# Patient Record
(demographics unavailable — no encounter records)

---

## 2025-03-21 NOTE — DISCUSSION/SUMMARY
[de-identified] : Right Hip trochanteric bursitis  Injection: Right hip trochanteric bursa. Indication: Trochanteric bursitis.  A discussion was had with the patient regarding this procedure and all questions were answered. All risks, benefits and alternatives were discussed. These included but were not limited to bleeding, infection, and allergic reaction. The patient lay in the left lateral decubitus position and the point of maximal tenderness over the right greater trochanter was localized. Alcohol was then used to clean the skin, and betadine was used to sterilize and prep the area in this location on lateral aspect of the right hip. Ethyl chloride spray was then used as a topical anesthetic. A 21-gauge needle was used to inject 8cc of 1% lidocaine and 1cc of 40mg/ml methylprednisolone into the right trochanteric bursa. A sterile bandage was then applied. The patient tolerated the procedure well and there were no complications.  Use ice, Tylenol, anti-inflammatory medication as needed. FU as needed.

## 2025-03-21 NOTE — HISTORY OF PRESENT ILLNESS
[de-identified] : 58 year old F patient presents today for evaluation of their right hip. She states began 3-4 months ago, recently has gotten worse. She enjoys walking regularly. She states she has pain when she is seated for long periods or if she walks for long periods. She does not recall any acute trauma or injury. She denies any numbness or tingling. The patient's past medical history, past surgical history, medications, allergies, and social history were reviewed by me today with the patient and documented accordingly. In addition, the patient's family history, which is noncontributory to this visit, was also reviewed.

## 2025-03-21 NOTE — PHYSICAL EXAM
[de-identified] : General Exam   Well developed, well nourished No apparent distress Oriented to person, place, and time Mood: Normal Affect: Normal Balance and coordination: Normal Gait: Normal  Right hip exam   Skin: Clean/dry and intact Inspection: No obvious deformity, no swelling, no ecchymosis. Tenderness:  + tenderness over greater trochanter/glut medius insertion. No tenderness pubic symphysis, pubic tubercle, hip flexors. No ttp ischial tuberosity or buttock. No ttp over the ASIS/Illiac crest. ROM: 0-120. Internal rotation 30 external rotation 70 Painful ROM: None Additional tests: No pain with circumduction negative impingement test at 90 mildly positive impingement test at 60 negative [facility] Strength: 5/5 hip flexion/ADD/ABD/Q/H/TA/GS/EHL Neuro: Sensation in tact to light touch throughout in dp/sp/tib/alex/saph distributions Pulses: 2+ DP/PT pulses [de-identified] : The following radiographs were ordered and read by me during this patients visit. I reviewed each radiograph in detail with the patient and discussed the findings as highlighted below.  AP pelvis AP lateral right hip radiographs were obtained today hip joint spaces well-maintained normal alignment no fracture

## 2025-05-14 NOTE — DISCUSSION/SUMMARY
[de-identified] : 59-year-old female longstanding plaint of lateral based right hip pain she is significant tenderness of the greater trochanter and significant weakness with resisted abduction she had extensive conservative care with greater than 6 weeks of anti-inflammatory medications and physical therapy exercises she had a corticosteroid injection to the lateral hip and trochanteric bursa without relief given the weakness in resisted hip abduction there is clinical concern for tearing of the gluteus medius and minimus tendons and MRIs is medically necessary at this time given her failure of conservative treatment for greater than 6 weeks she will follow-up after MRI.  All questions were answered

## 2025-05-14 NOTE — PHYSICAL EXAM
[de-identified] : Right hip exam   Skin: Clean/dry and intact Inspection: No obvious deformity, no swelling, no ecchymosis. Tenderness:  + tenderness over greater trochanter/glut medius insertion. No tenderness pubic symphysis, pubic tubercle, hip flexors. No ttp ischial tuberosity or buttock. No ttp over the ASIS/Illiac crest. ROM: 0-120. Internal rotation 30 external rotation 70 Painful ROM: None Additional tests: No pain with circumduction negative impingement test at 90 mildly positive impingement test at 60 negative Najma negative [facility] Strength: 5/5 hip flexion/ADD/Q/H/TA/GS/EHL 4/5 ABD Neuro: Sensation in tact to light touch throughout in dp/sp/tib/alex/saph distributions Pulses: 2+ DP/PT pulses

## 2025-05-14 NOTE — HISTORY OF PRESENT ILLNESS
[de-identified] : 59 year old F patient presents today for evaluation of their right hip. She states began 3-4 months ago, recently has gotten worse. She enjoys walking regularly. She states she has pain when she is seated for long periods or if she walks for long periods. She does not recall any acute trauma or injury. She denies any numbness or tingling.  She is a corticosteroid injection 2 months ago her pain has improved somewhat but she still experiences pain and weakness.  She feels like after walking 1 to 2 miles she is difficulty and walks with a limp.  She has difficulty in doing cross leg and sitting and standing from that position The patient's past medical history, past surgical history, medications, allergies, and social history were reviewed by me today with the patient and documented accordingly. In addition, the patient's family history, which is noncontributory to this visit, was also reviewed.

## 2025-06-27 NOTE — DISCUSSION/SUMMARY
[de-identified] : MRI right hip reviewed moderate to severe tendinosis of the gluteus minimus tendon with amorphus appearing fibers and surrounding peritendinous edema. Recommend continued nonoperative care given prescription for physical therapy, home exercise program/stretching and strengthening.  Over the counter NSAIDs / Tylenol as tolerated, as needed for pain, side effects discussed.  All questions answered. Given prescription for Voltaren gel side effects discussed. FU as needed. If not improved can consider ultrasound-guided injection to the gluteus minimus.  All questions were answered she may follow-up as needed Additional Notes: Pt is at 10.2 grams out of 14.5 grams\\nPt states she did not get her Rx last month. Detail Level: Simple

## 2025-06-27 NOTE — HISTORY OF PRESENT ILLNESS
[de-identified] : 59 year old F patient presents today for evaluation of their right hip. She states began 3-4 months ago, recently has gotten worse. She enjoys walking regularly. She states she has pain when she is seated for long periods or if she walks for long periods. She does not recall any acute trauma or injury. She denies any numbness or tingling.  She is a corticosteroid injection 2 months ago her pain has improved somewhat but she still experiences pain and weakness.  She feels like after walking 1 to 2 miles she is difficulty and walks with a limp.  She has difficulty in doing cross leg and sitting and standing from that position The patient's past medical history, past surgical history, medications, allergies, and social history were reviewed by me today with the patient and documented accordingly. In addition, the patient's family history, which is noncontributory to this visit, was also reviewed.

## 2025-06-27 NOTE — PHYSICAL EXAM
[de-identified] : Right hip exam   Skin: Clean/dry and intact Inspection: No obvious deformity, no swelling, no ecchymosis. Tenderness:  + tenderness over greater trochanter/glut medius insertion. No tenderness pubic symphysis, pubic tubercle, hip flexors. No ttp ischial tuberosity or buttock. No ttp over the ASIS/Illiac crest. ROM: 0-120. Internal rotation 30 external rotation 70 Painful ROM: None Additional tests: No pain with circumduction negative impingement test at 90 mildly positive impingement test at 60 negative Najma negative [facility] Strength: 5/5 hip flexion/ADD/Q/H/TA/GS/EHL 4/5 ABD Neuro: Sensation in tact to light touch throughout in dp/sp/tib/alex/saph distributions Pulses: 2+ DP/PT pulses [de-identified] : 	 EXAM: [id number] - MR HIP RT  - ORDERED BY: FRANKLIN CROWLEY   PROCEDURE DATE:  05/23/2025    INTERPRETATION:  CLINICAL INDICATION: Right-sided hip pain. Greater trochanteric bursitis.  Multiplanar Multisequence MR of the right hip.  Prior Studies: None.  FINDINGS:  ACETABULAR LABRUM AND HYALINE CARTILAGE: There is degeneration of the acetabular labrum with undersurface tearing and blunting along the anterosuperior to posterior superior portion. There is mild chondral thinning throughout the superolateral aspect of the femoral head and along the opposing acetabulum. Deep chondral fissuring is seen along the medial aspect of the femoral head.  OSSEOUS MORPHOLOGY: No cam-type morphology.  MARROW: No acute fracture or osteonecrosis.  SYNOVIUM: No hip joint effusion.  MUSCLES AND TENDONS: Moderate tendinosis of the hamstring tendons with mild surrounding peritendinous edema. There is intrasubstance tearing involving both the semimembranosus and conjoined biceps femoris/semitendinosus contribution. Trace edema within the iliopsoas bursa suggestive of mild bursitis. There is edema within the quadratus femoris muscle with narrowing of the ischial femoral space which can be seen in the setting of ischial femoral impingement. There is moderate to severe tendinosis of the gluteus minimus tendon with amorphus appearing fibers and surrounding peritendinous edema. There is an osseous excrescence arising from the anterior margin of the greater trochanter in this region likely related to traction enthesophyte. This traction enthesophyte is diffusely edematous consistent with stress reaction. Mild overlying greater trochanteric bursitis. The gluteus medius tendinous insertion is intact. The adductor tendinous insertions and origins are maintained.  NERVES: Right sciatic nerve is normal in course, signal, and morphology.  INTRAPELVIC STRUCTURES: Intact.  SUBCUTANEOUS TISSUES: Within normal limits.  IMPRESSION:  Moderate to severe tendinosis of the gluteus minimus tendon with amorphus appearing fibers and surrounding peritendinous edema. Osseous excrescence arising from the anterior margin of the greater trochanters likely related to traction enthesophyte. There is traction enthesophyte is diffusely edematous consistent with stress reaction.  Findings suggestive of ischial femoral impingement.  Moderate tendinosis of the hamstring tendon origin with foci of intrasubstance tearing involving both the semimembranosus and conjoined biceps femoris/semitendinosus contribution's.  Trace edema within the iliopsoas bursa suggestive of minimal bursitis.  Mild right hip arthrosis.  --- End of Report ---

## 2025-07-11 NOTE — PHYSICAL EXAM
[de-identified] : Constitutional: Well-nourished, well-developed, No acute distress Respiratory:  Good respiratory effort, no SOB Lymphatic: No regional lymphadenopathy, no lymphedema Psychiatric: Pleasant and normal affect, alert and oriented x3 Skin: Clean dry and intact B/L UE/LE Musculoskeletal: normal except where as noted in regional exam     Right knee: No asymmetry, malalignment, or swelling, Full ROM, 5/5 strength in Flex/Ext, Joints stable    Right foot ankle: APPEARANCE: no marked deformities or malalignment. No swelling POSITIVE TENDERNESS: Medial aspect and plantar aspect of right hallux proximal phalanx NONTENDER: medial malleolus, lateral malleolus, tibialis posterior tendon, achilles tendon, no marked thickening of tendon, ATFL, CFL, PTFL, anterior tibiofibular ligament (high ankle), sinus tarsus, deltoid ligaments, 5th metatarsal.  RANGE OF MOTION: full & painless.  RESISTIVE TESTING: painless resisted dorsiflexion, plantar flexion, inversion & eversion.  SPECIAL TESTS: neg anterior drawer. neg talar tilt. NEURO: Normal sensation of LE     [de-identified] : PROCEDURE DATE:  06/19/2025  INTERPRETATION:  EXAMINATION: MRI of the right foot without contrast  CLINICAL INFORMATION: Right toe pain. Status post fall.  TECHNIQUE: Multiplanar, multisequential MR imaging was performed.  FINDINGS: There is a nondisplaced fracture of the medial aspect of the head of the proximal phalanx of the hallux with adjacent marrow edema. There is a small associated IP joint effusion at the hallux. There are no other fractures. There are no other joint effusions or advanced arthritic changes. The capsular ligaments at the metatarsophalangeal joints and interphalangeal joints are intact. The Lisfranc ligament is intact. Tarsometatarsal alignment is normal. There are no tendon or muscle tears. There is no muscle atrophy. There is a small amount fluid within the first and third intermetatarsal space bursae.  IMPRESSION: Nondisplaced fracture of the medial aspect of the head of the proximal phalanx of the hallux with adjacent marrow edema.  --- End of Report ---

## 2025-07-11 NOTE — DISCUSSION/SUMMARY
[de-identified] : MARCOS LEVI  is a 59 year F with a nondisplaced fracture of the medial aspect of the head of the proximal phalanx of the hallux.   The patient was extensively counseled on treatment options including but not limited to observation, rest/activity modification, bracing, anti-inflammatory medications, physical therapy, injections, and surgery. The natural history of the disease was thoroughly explained. We discussed that the majority of the time, this condition can be initially treated conservatively.   Patient would like to proceed with: - Activity modification/rest -If symptoms persist for an additional 2 months I will have him see the foot and ankle specialist for possible treatment options -Multivitamins calcium and vitamin D which they already taking     I have personally obtained the history, reviewed the ROS as noted, and performed the physical examination today. The patient and I discussed the assessment and options and developed the plan. All questions were answered and the patient stated their understanding of the treatment plan and appreciation of the visit.   My cumulative time spent on this patient's visit included: Preparation for the visit, review of the medical records, review of pertinent diagnostic studies, examination and counseling of the patient on the above diagnosis, treatment plan and prognosis, orders of diagnostic tests, medications and/or appropriate procedures and documentation in the medical records of today's visit.   Elias Maloney MD

## 2025-07-11 NOTE — HISTORY OF PRESENT ILLNESS
[de-identified] : MARCOS LEVI  is a 59 year F  presents today for follow-up of right foot pain and MRI review.  She has had symptoms for about 7 months however has not rested the foot since her injury.  MRI shows a nondisplaced fracture of the medial aspect of the proximal phalanx of the hallux with adjacent bone marrow edema.